# Patient Record
Sex: FEMALE | Race: BLACK OR AFRICAN AMERICAN | NOT HISPANIC OR LATINO | Employment: UNEMPLOYED | URBAN - METROPOLITAN AREA
[De-identification: names, ages, dates, MRNs, and addresses within clinical notes are randomized per-mention and may not be internally consistent; named-entity substitution may affect disease eponyms.]

---

## 2019-01-18 ENCOUNTER — OFFICE VISIT (OUTPATIENT)
Dept: FAMILY MEDICINE CLINIC | Facility: CLINIC | Age: 44
End: 2019-01-18

## 2019-01-18 VITALS
DIASTOLIC BLOOD PRESSURE: 82 MMHG | HEART RATE: 64 BPM | WEIGHT: 182 LBS | SYSTOLIC BLOOD PRESSURE: 128 MMHG | RESPIRATION RATE: 18 BRPM | TEMPERATURE: 98.2 F

## 2019-01-18 DIAGNOSIS — R42 VERTIGO: ICD-10-CM

## 2019-01-18 DIAGNOSIS — I10 ESSENTIAL HYPERTENSION: Primary | ICD-10-CM

## 2019-01-18 DIAGNOSIS — G43.009 MIGRAINE WITHOUT AURA AND WITHOUT STATUS MIGRAINOSUS, NOT INTRACTABLE: ICD-10-CM

## 2019-01-18 DIAGNOSIS — M62.838 MUSCLE SPASM: ICD-10-CM

## 2019-01-18 DIAGNOSIS — M79.7 FIBROMYALGIA: ICD-10-CM

## 2019-01-18 DIAGNOSIS — F33.1 MODERATE EPISODE OF RECURRENT MAJOR DEPRESSIVE DISORDER (HCC): ICD-10-CM

## 2019-01-18 PROCEDURE — 99204 OFFICE O/P NEW MOD 45 MIN: CPT | Performed by: FAMILY MEDICINE

## 2019-01-18 RX ORDER — CYCLOBENZAPRINE HCL 10 MG
10 TABLET ORAL 2 TIMES DAILY PRN
Qty: 180 TABLET | Refills: 0 | Status: SHIPPED | OUTPATIENT
Start: 2019-01-18 | End: 2019-06-06 | Stop reason: SDUPTHER

## 2019-01-18 RX ORDER — DULOXETIN HYDROCHLORIDE 30 MG/1
CAPSULE, DELAYED RELEASE ORAL
Qty: 60 CAPSULE | Refills: 1 | Status: SHIPPED | OUTPATIENT
Start: 2019-01-18 | End: 2019-03-19 | Stop reason: SDUPTHER

## 2019-01-18 RX ORDER — PROPRANOLOL HYDROCHLORIDE 40 MG/1
40 TABLET ORAL 3 TIMES DAILY
COMMUNITY
End: 2019-01-18 | Stop reason: SDUPTHER

## 2019-01-18 RX ORDER — PROPRANOLOL HYDROCHLORIDE 40 MG/1
40 TABLET ORAL 3 TIMES DAILY
Qty: 270 TABLET | Refills: 1 | Status: SHIPPED | OUTPATIENT
Start: 2019-01-18

## 2019-01-18 RX ORDER — PREGABALIN 50 MG/1
50 CAPSULE ORAL DAILY
COMMUNITY
End: 2019-01-18

## 2019-01-18 RX ORDER — CYCLOBENZAPRINE HCL 5 MG
5 TABLET ORAL 2 TIMES DAILY PRN
COMMUNITY
End: 2019-01-18 | Stop reason: SDUPTHER

## 2019-01-18 RX ORDER — SUMATRIPTAN 100 MG/1
100 TABLET, FILM COATED ORAL TAKE AS DIRECTED
COMMUNITY
End: 2019-03-06 | Stop reason: SDUPTHER

## 2019-01-18 RX ORDER — TRAZODONE HYDROCHLORIDE 50 MG/1
50 TABLET ORAL NIGHTLY
COMMUNITY
End: 2019-01-18 | Stop reason: SDUPTHER

## 2019-01-18 RX ORDER — HYDROCHLOROTHIAZIDE 25 MG/1
25 TABLET ORAL DAILY
Qty: 90 TABLET | Refills: 1 | Status: SHIPPED | OUTPATIENT
Start: 2019-01-18

## 2019-01-18 RX ORDER — KETOROLAC TROMETHAMINE 10 MG/1
10 TABLET, FILM COATED ORAL DAILY PRN
COMMUNITY
End: 2019-01-18

## 2019-01-18 RX ORDER — HYDROCHLOROTHIAZIDE 25 MG/1
25 TABLET ORAL DAILY
COMMUNITY
End: 2019-01-18 | Stop reason: SDUPTHER

## 2019-01-18 RX ORDER — MECLIZINE HCL 25MG 25 MG/1
25 TABLET, CHEWABLE ORAL 3 TIMES DAILY PRN
Qty: 30 EACH | Refills: 2 | Status: SHIPPED | OUTPATIENT
Start: 2019-01-18

## 2019-01-18 RX ORDER — MECLIZINE HCL 25MG 25 MG/1
25 TABLET, CHEWABLE ORAL 3 TIMES DAILY PRN
COMMUNITY
End: 2019-01-18 | Stop reason: SDUPTHER

## 2019-01-18 RX ORDER — TRAZODONE HYDROCHLORIDE 50 MG/1
50 TABLET ORAL NIGHTLY
Qty: 90 TABLET | Refills: 1 | Status: SHIPPED | OUTPATIENT
Start: 2019-01-18

## 2019-01-18 NOTE — PATIENT INSTRUCTIONS
Go to the nearest ER or return to clinic if symptoms worsen, fever/chill develop      Myofascial Pain Syndrome and Fibromyalgia  Myofascial pain syndrome and fibromyalgia are both pain disorders. This pain may be felt mainly in your muscles.  · Myofascial pain syndrome:  ? Always has trigger points or tender points in the muscle that will cause pain when pressed. The pain may come and go.  ? Usually affects your neck, upper back, and shoulder areas. The pain often radiates into your arms and hands.  · Fibromyalgia:  ? Has muscle pains and tenderness that come and go.  ? Is often associated with fatigue and sleep disturbances.  ? Has trigger points.  ? Tends to be long-lasting (chronic), but is not life-threatening.    Fibromyalgia and myofascial pain are not the same. However, they often occur together. If you have both conditions, each can make the other worse. Both are common and can cause enough pain and fatigue to make day-to-day activities difficult.  What are the causes?  The exact causes of fibromyalgia and myofascial pain are not known. People with certain gene types may be more likely to develop fibromyalgia. Some factors can be triggers for both conditions, such as:  · Spine disorders.  · Arthritis.  · Severe injury (trauma) and other physical stressors.  · Being under a lot of stress.  · A medical illness.    What are the signs or symptoms?  Fibromyalgia  The main symptom of fibromyalgia is widespread pain and tenderness in your muscles. This can vary over time. Pain is sometimes described as stabbing, shooting, or burning. You may have tingling or numbness, too. You may also have sleep problems and fatigue. You may wake up feeling tired and groggy (fibro fog). Other symptoms may include:  · Bowel and bladder problems.  · Headaches.  · Visual problems.  · Problems with odors and noises.  · Depression or mood changes.  · Painful menstrual periods (dysmenorrhea).  · Dry skin or eyes.    Myofascial pain  syndrome  Symptoms of myofascial pain syndrome include:  · Tight, ropy bands of muscle.  · Uncomfortable sensations in muscular areas, such as:  ? Aching.  ? Cramping.  ? Burning.  ? Numbness.  ? Tingling.  ? Muscle weakness.  · Trouble moving certain muscles freely (range of motion).    How is this diagnosed?  There are no specific tests to diagnose fibromyalgia or myofascial pain syndrome. Both can be hard to diagnose because their symptoms are common in many other conditions. Your health care provider may suspect one or both of these conditions based on your symptoms and medical history. Your health care provider will also do a physical exam.  The key to diagnosing fibromyalgia is having pain, fatigue, and other symptoms for more than three months that cannot be explained by another condition.  The key to diagnosing myofascial pain syndrome is finding trigger points in muscles that are tender and cause pain elsewhere in your body (referred pain).  How is this treated?  Treating fibromyalgia and myofascial pain often requires a team of health care providers. This usually starts with your primary provider and a physical therapist. You may also find it helpful to work with alternative health care providers, such as massage therapists or acupuncturists.  Treatment for fibromyalgia may include medicines. This may include nonsteroidal anti-inflammatory drugs (NSAIDs), along with other medicines.  Treatment for myofascial pain may also include:  · NSAIDs.  · Cooling and stretching of muscles.  · Trigger point injections.  · Sound wave (ultrasound) treatments to stimulate muscles.    Follow these instructions at home:  · Take medicines only as directed by your health care provider.  · Exercise as directed by your health care provider or physical therapist.  · Try to avoid stressful situations.  · Practice relaxation techniques to control your stress. You may want to try:  ? Biofeedback.  ? Visual  imagery.  ? Hypnosis.  ? Muscle relaxation.  ? Yoga.  ? Meditation.  · Talk to your health care provider about alternative treatments, such as acupuncture or massage treatment.  · Maintain a healthy lifestyle. This includes eating a healthy diet and getting enough sleep.  · Consider joining a support group.  · Do not do activities that stress or strain your muscles. That includes repetitive motions and heavy lifting.  Where to find more information:  · National Fibromyalgia Association: www.fmaware.org  · Arthritis Foundation: www.arthritis.org  · American Chronic Pain Association: www.theacpa.org/condition/myofascial-pain  Contact a health care provider if:  · You have new symptoms.  · Your symptoms get worse.  · You have side effects from your medicines.  · You have trouble sleeping.  · Your condition is causing depression or anxiety.  This information is not intended to replace advice given to you by your health care provider. Make sure you discuss any questions you have with your health care provider.  Document Released: 12/18/2006 Document Revised: 05/25/2017 Document Reviewed: 09/23/2015  Elsevier Interactive Patient Education © 2018 [employer]

## 2019-01-18 NOTE — PROGRESS NOTES
Subjective   Kika Lyons is a 43 y.o. female.   Chief Complaint   Patient presents with   • Establish Care     moved from [place]   • Pain     pateint dx with fibromyalgia, migraines, vertigo, and spasms    She recently moved from [place].  Hypertension   This is a chronic problem. The current episode started more than 1 year ago. The problem is controlled. Pertinent negatives include no chest pain, palpitations or shortness of breath. Agents associated with hypertension include NSAIDs. Past treatments include diuretics. Current antihypertension treatment includes diuretics. The current treatment provides significant improvement. There are no compliance problems.       History of fibromyalgia.  Treated with Lyrica, however requesting to change this because insurance doesn't cover it and it is expensive.   In the past, she is treated with gabapentin however she didn't feel like this did much improvement for her.  She has never tried Cymbalta for treatment of fibromyalgia.  She does have a history of depression, currently treated with sertraline.    Vertigo  Intermittent condition.  It actually worsened after her recent move from [place].  She did receive treatment at [facility] physiotherapy, symptoms has resolved.  Occasionally she does take meclizine as needed to improve dizziness.    Migraines  Chronic condition treated with propranolol daily and sumatriptan for breakthrough migraines.  Condition is stable with current treatment.    The following portions of the patient's history were reviewed and updated as appropriate: allergies, current medications, past family history, past medical history, past social history, past surgical history and problem list.    Review of Systems   Constitutional: Negative for chills and fever.   HENT: Negative.    Respiratory: Negative for cough, chest tightness, shortness of breath and wheezing.    Cardiovascular: Negative for chest pain, palpitations and leg swelling.    Gastrointestinal: Negative for abdominal pain, blood in stool, nausea and vomiting.   Endocrine: Negative.    Musculoskeletal: Positive for arthralgias and myalgias. Negative for gait problem.   Skin: Negative for rash.   Neurological: Positive for dizziness and headache. Negative for confusion.   Psychiatric/Behavioral: Positive for sleep disturbance and depressed mood. Negative for agitation. The patient is not nervous/anxious.        Objective   Physical Exam   Constitutional: She is oriented to person, place, and time. She appears well-developed and well-nourished.   HENT:   Head: Normocephalic and atraumatic.   Right Ear: Hearing, tympanic membrane, external ear and ear canal normal.   Left Ear: Hearing, tympanic membrane, external ear and ear canal normal.   Nose: Nose normal.   Mouth/Throat: Uvula is midline, oropharynx is clear and moist and mucous membranes are normal.   Eyes: Conjunctivae are normal.   Neck: Neck supple. No thyromegaly present.   Cardiovascular: Normal rate, regular rhythm and normal heart sounds.   No murmur heard.  Pulmonary/Chest: Effort normal and breath sounds normal. She has no wheezes.   Musculoskeletal: She exhibits no edema or deformity.   Lymphadenopathy:     She has no cervical adenopathy.   Neurological: She is alert and oriented to person, place, and time. No cranial nerve deficit.   Skin: Skin is warm and dry.   Psychiatric: She has a normal mood and affect. Her behavior is normal. Judgment and thought content normal.   Nursing note and vitals reviewed.        Assessment/Plan   Kika was seen today for establish care and pain.    Diagnoses and all orders for this visit:    Essential hypertension  -     hydrochlorothiazide (HYDRODIURIL) 25 MG tablet; Take 1 tablet by mouth Daily.    Fibromyalgia  -     DULoxetine (CYMBALTA) 30 MG capsule; Take 1 tab po daily x 1 week, then increase to 2 tabs po q daily    Vertigo  -     meclizine 25 MG chewable tablet chewable tablet; Chew 1  tablet 3 (Three) Times a Day As Needed (dizziness).    Migraine without aura and without status migrainosus, not intractable  -     propranolol (INDERAL) 40 MG tablet; Take 1 tablet by mouth 3 (Three) Times a Day.    Muscle spasm  -     cyclobenzaprine (FLEXERIL) 10 MG tablet; Take 1 tablet by mouth 2 (Two) Times a Day As Needed for Muscle Spasms.    Moderate episode of recurrent major depressive disorder (CMS/HCC)  -     traZODone (DESYREL) 50 MG tablet; Take 1 tablet by mouth Every Night.  -     Discontinue: sertraline (ZOLOFT) 50 MG tablet; Take 1 tablet by mouth Daily.  -     DULoxetine (CYMBALTA) 30 MG capsule; Take 1 tab po daily x 1 week, then increase to 2 tabs po q daily      She requests to change Lyrica due to cost.  She would like to try Cymbalta to improve both fibromyalgia and treat depression.  Advised her stop sertraline, Cymbalta sent to the pharmacy.  Other chronic conditions are stable.  No changes.  Medications refilled.  She states that she has completed labs within the last 6 months, we'll try to get records from her previous PCP.

## 2019-03-05 ENCOUNTER — TELEPHONE (OUTPATIENT)
Dept: FAMILY MEDICINE CLINIC | Facility: CLINIC | Age: 44
End: 2019-03-05

## 2019-03-05 NOTE — TELEPHONE ENCOUNTER
----- Message from Kayleen Alexander sent at 3/5/2019  4:17 PM EST -----  Contact: [facility]ALE; MED REFILL   Medications      SUMAtriptan (IMITREX) 100 MG tablet Take one tablet at onset of headache. May repeat dose one time in 2 hours if headache not relieved. Take one tablet at onset of headache. May repeat dose one time in 2 hours if headache not relieved.    PT ALSO WANTED TO KNOW IF SHE CAN GET LINZESS 290 MG REFILLED. SHE HAS TAKEN IT BEFORE BUT  HER INS WON'T COVER THE TRULANCE ANYLONGER.      Preferred Pharmacies      [facility]/pharmacy #[id number] - [place], [place] - [address] [place] [address] AT [place] [address] - [phone]  - [phone] (Phone)  [phone] (Fax)       [phone]

## 2019-03-06 RX ORDER — SUMATRIPTAN 100 MG/1
TABLET, FILM COATED ORAL
Qty: 9 TABLET | Refills: 5 | Status: SHIPPED | OUTPATIENT
Start: 2019-03-06 | End: 2019-12-13 | Stop reason: SDUPTHER

## 2019-03-19 DIAGNOSIS — M79.7 FIBROMYALGIA: ICD-10-CM

## 2019-03-19 DIAGNOSIS — F33.1 MODERATE EPISODE OF RECURRENT MAJOR DEPRESSIVE DISORDER (HCC): ICD-10-CM

## 2019-03-19 RX ORDER — DULOXETIN HYDROCHLORIDE 30 MG/1
60 CAPSULE, DELAYED RELEASE ORAL DAILY
Qty: 60 CAPSULE | Refills: 1 | Status: SHIPPED | OUTPATIENT
Start: 2019-03-19

## 2019-06-06 DIAGNOSIS — M62.838 MUSCLE SPASM: ICD-10-CM

## 2019-06-09 RX ORDER — CYCLOBENZAPRINE HCL 10 MG
10 TABLET ORAL 2 TIMES DAILY PRN
Qty: 180 TABLET | Refills: 0 | Status: SHIPPED | OUTPATIENT
Start: 2019-06-09

## 2019-07-12 ENCOUNTER — TELEPHONE (OUTPATIENT)
Dept: FAMILY MEDICINE CLINIC | Facility: CLINIC | Age: 44
End: 2019-07-12

## 2019-07-12 NOTE — TELEPHONE ENCOUNTER
Called patient to schedule medicare wellness and she states that she will no longer see pcp because she has moved out of state.  She did not have a name for her current pcp.  Seth has been updated.

## 2019-07-25 DIAGNOSIS — F33.1 MODERATE EPISODE OF RECURRENT MAJOR DEPRESSIVE DISORDER (HCC): ICD-10-CM

## 2019-07-25 RX ORDER — TRAZODONE HYDROCHLORIDE 50 MG/1
TABLET ORAL
Qty: 90 TABLET | Refills: 1 | OUTPATIENT
Start: 2019-07-25

## 2019-07-25 NOTE — TELEPHONE ENCOUNTER
See phone message from July 2019 in her chart.  She has moved out of state, no longer following with me as her PCP.

## 2019-12-13 RX ORDER — SUMATRIPTAN 100 MG/1
TABLET, FILM COATED ORAL
Qty: 9 TABLET | Refills: 0 | Status: SHIPPED | OUTPATIENT
Start: 2019-12-13

## 2020-01-02 RX ORDER — SUMATRIPTAN 100 MG/1
TABLET, FILM COATED ORAL
Qty: 9 TABLET | Refills: 0 | OUTPATIENT
Start: 2020-01-02